# Patient Record
Sex: FEMALE | Race: WHITE | URBAN - METROPOLITAN AREA
[De-identification: names, ages, dates, MRNs, and addresses within clinical notes are randomized per-mention and may not be internally consistent; named-entity substitution may affect disease eponyms.]

---

## 2023-07-11 PROBLEM — I50.9 DECOMPENSATED HEART FAILURE (HCC): Status: ACTIVE | Noted: 2020-01-08

## 2023-07-11 PROBLEM — D72.829 LEUKOCYTOSIS: Status: ACTIVE | Noted: 2023-07-11

## 2023-07-11 PROBLEM — J18.9 PNEUMONIA: Status: ACTIVE | Noted: 2020-01-08

## 2023-07-11 PROBLEM — D80.1 HYPOGAMMAGLOBULINEMIA (HCC): Status: ACTIVE | Noted: 2023-07-11

## 2023-07-11 PROBLEM — N20.1 URETERAL STONE: Status: ACTIVE | Noted: 2021-10-05

## 2023-07-11 PROBLEM — R91.8 PULMONARY INFILTRATE ON CHEST X-RAY: Status: ACTIVE | Noted: 2020-01-08

## 2023-07-11 PROBLEM — D50.9 IRON DEFICIENCY ANEMIA: Status: ACTIVE | Noted: 2023-07-11

## 2024-01-18 ENCOUNTER — OFFICE VISIT (OUTPATIENT)
Age: 74
End: 2024-01-18

## 2024-01-18 VITALS
SYSTOLIC BLOOD PRESSURE: 100 MMHG | WEIGHT: 200 LBS | DIASTOLIC BLOOD PRESSURE: 70 MMHG | OXYGEN SATURATION: 95 % | HEART RATE: 65 BPM | BODY MASS INDEX: 37.76 KG/M2 | HEIGHT: 61 IN

## 2024-01-18 DIAGNOSIS — I48.0 PAROXYSMAL ATRIAL FIBRILLATION (HCC): ICD-10-CM

## 2024-01-18 DIAGNOSIS — I48.20 CHRONIC ATRIAL FIBRILLATION (HCC): Primary | ICD-10-CM

## 2024-01-18 NOTE — PROGRESS NOTES
History of Present Illness:  73 year-old female referred by Dr. Givens for evaluation of atrial fibrillation and treatment options.  She was diagnosed with atrial fibrillation about ten years ago by her report.  She had had three cardioversions and none of them lasted for more than a day at that time.  She had been rate controlled.  She does have some chronic fatigue and she is getting screened for sleep apnea.  No chest pain, syncope, PND, orthopnea or edema.  No current bleeding issues with Eliquis, but remotely she does have significant bleeding problems.  She also has a history of hypertension and diabetes.  She is here to discuss options.      Impression:    Permanent atrial fibrillation by report for at least about ten years with history of three cardioversions a number of years ago, all recured in a very short period of time, less than 24 hours.    Chronic Eliquis use, but history of significant remote bleeding.    Echocardiogram in 11/2023 with EF of 58% and moderate LVH, severely dilated left atrium is reviewed.    Hypertension.   Diabetes.      Plan:  After discussing her minimal symptoms with permanent atrial fibrillation, history of three cardioversions that were unsuccessful, as well as severely dilated left atrium, the chance of success with ablation is quite low.  After a lengthy discussion of weighing risks and benefits, we both elected to proceed with rate control only for now and not pursue ablation or antiarrhythmics given low chance of success with risk profile.      Thank you kindly for allowing me to participate in this patient's care.  I can see her back as needed and she will continue to follow up with Dr. Givens.          Wt Readings from Last 3 Encounters:   01/18/24 90.7 kg (200 lb)   07/11/23 93 kg (205 lb)   06/29/23 93 kg (205 lb)     Past Medical History:   Diagnosis Date    Atrial fibrillation (HCC)     CHF (congestive heart failure) (HCC)     Diabetes (HCC)     Hypertension     Thyroid